# Patient Record
Sex: FEMALE | Race: WHITE | Employment: FULL TIME | URBAN - METROPOLITAN AREA
[De-identification: names, ages, dates, MRNs, and addresses within clinical notes are randomized per-mention and may not be internally consistent; named-entity substitution may affect disease eponyms.]

---

## 2019-05-18 ENCOUNTER — ED HISTORICAL/CONVERTED ENCOUNTER (OUTPATIENT)
Dept: OTHER | Age: 39
End: 2019-05-18

## 2019-06-07 ENCOUNTER — OP HISTORICAL/CONVERTED ENCOUNTER (OUTPATIENT)
Dept: OTHER | Age: 39
End: 2019-06-07

## 2019-06-19 ENCOUNTER — OP HISTORICAL/CONVERTED ENCOUNTER (OUTPATIENT)
Dept: OTHER | Age: 39
End: 2019-06-19

## 2021-12-02 ENCOUNTER — ANESTHESIA EVENT (OUTPATIENT)
Dept: MEDSURG UNIT | Age: 41
End: 2021-12-02
Payer: SELF-PAY

## 2021-12-02 PROBLEM — Z41.1 ENCOUNTER FOR COSMETIC SURGERY: Status: ACTIVE | Noted: 2021-12-02

## 2021-12-02 RX ORDER — ZOLPIDEM TARTRATE 10 MG/1
TABLET ORAL
COMMUNITY

## 2021-12-02 RX ORDER — GABAPENTIN 600 MG/1
900 TABLET ORAL 4 TIMES DAILY
COMMUNITY

## 2021-12-02 NOTE — H&P
Melvi Shine  : 1980  DOS: 2021    Chief Complaint: consultation       Allergies: No Non-Medication Allergies (NNMA) , Reglan       Medications: zolpidem 10 mg oral tablet , gabapentin 600 mg oral tablet       Medical History: Height: 5' 7\", Weight: 134 lbs    Pulmonary System: Negative  Cardiac System: Negative  Blood and Liver Systems: Negative  Neurologic and Endocrine Systems: Thyroid trouble, Anxiety, Depression, ADHD, Arthritis. Psoriasis, Atopic Dermatitis  GI,  and Reproductive Systems: Negative  Cancer: Negative  Other: RLS, Unspecified tissue disorder, Graves Disease, Vitiligo    Surgical History: Splenectomy  ,  Hysterectomy       Family History: Cancer Mother , Heart Disease Maternal Grandmother , Melanoma Mother , Depression Paternal Grandfather , Hypertension Father , Depression Father , High Blood Pressure Father , Heart Disease Father , Hypertension Paternal Grandfather , Depression Sister , High Blood Pressure Paternal Grandfather , Cancer Maternal Grandfather , Heart Disease Paternal Grandfather , Stroke Father , Depression Daughter , Hypertension Maternal Grandmother , Depression Brother , High Blood Pressure Maternal Grandmother       Social History: Pregnancy   3 Children          Ms. 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 She is an otherwise healthy woman who has seen and appreciated loss of upper pole fullness and involutional changes of her breasts following the birth of 3 children that are ages 15, 25, and 24 with breast-feeding. Her breast cancer history is negative, and she has never had a mammogram.  She understands that over time she has seen some skin tone changes to her breasts and was hoping to avoid the need for a mastopexy as well as implant augmentation. She denies previous elective surgery.     Review of systems reveals normal heart and lung sounds. Examination demonstrates otherwise normally developed and equal breast anatomy with loss of upper pole fullness, postpartum involutional changes, and approximately 2 cm of pseudoptosis. Her nipple areolar complexes are minimally enlarged but sit above the inframammary fold level. Her sternal notch to nipple distance is about 23.5 cm bilaterally, and her ideal nipple position is around 19-20 cm. The nipple to fold distance is about 10 cm bilaterally and her base diameter is 14 cm. I had a lengthy discussion with Melvi, regarding breast augmentation mammoplasty. Angelia Mendosa understands this is performed under a general anesthetic on an outpatient basis. I diagrammed on paper and patients right breast where the incisions are made and we discussed the differences between saline and silicone implants, smooth versus textured, round versus anatomic, submuscular versus subglandular, and the various incisions. In most cases an implant is placed in a submuscular plane and the incisions are closed in layers with dissolvable sutures. A compression Ace wrap is used for 2 days then converted to a loose surgical bra or no bra at all for 4 weeks. There is no strenuous exercise for 4 weeks. The risks and complications include but are not limited to infection, pain, bleeding, hematoma's requiring re-operation, skin sensitivity changes, vascular compromise to tissues resulting in partial or complete loss of tissues, resultant open wounds, the need for long term wound care and dressing changes and scarring, irregularities and asymmetries requiring touch-up and revision surgery, an unacceptable cosmetic result, and other things including cardiac and pulmonary risks that include death. Implant specific problems include capsular contracture, scalloping or rippling, implant malposition, implant failure, and implant infection.     In addition to augmentation my concern is that she will require a skin tightening procedure or mastopexy to provide her with the most ideal cosmetic result. We discussed ptosis, pseudoptosis, and mastopexy procedures to include aram areolar, vertical, and full wise pattern. She understands once the implant of her choosing is placed usually in a submuscular plane, additional incisions are made around the nipple areolar complex and the lower pole of the breast to address nipple areolar complex size and position. Our goal is to ensure all tissue sits above the inframammary fold. Any skin that is excised from the lower pole results in an excision wound that is closed in layers with dissolvable sutures. A compression garment is used with associated activity restrictions while she is healing which can extend beyond 6 weeks. There is no exercise for 4 weeks. The risks and complications include but are not limited to infection, pain, bleeding, hematomas requiring re-operation, skin sensitivity changes, vascular compromise to tissues resulting in partial or complete loss of tissues, resultant open wounds, the need for long term wound care and dressing changes and scarring, irregularities and asymmetries requiring touch-up and revision surgery, an unacceptable cosmetic result, and other things including cardiac and pulmonary risks that include death. Additional implant specific risks include capsule contracture, scalloping or rippling, implant malposition, implant failure, and implant infection. We also discussed breast implant illness and JOVON-ALCL. She was sized today with BenchPrep and selected a 400-425 cc smooth round cohesive gel device in a submuscular plane. I believe she may still require at least a aram areolar mastopexy. Her questions were answered, and pictures were taken. They will meet with Ena to discuss the fees. Addendum: Patient re sized at her pre op visit with BenchPrep and decided on a 350cc smooth round gel device.     The patient was counseled about the risks of rut Covid-19 during their perioperative period and any recovery window from their procedure. The patient was made aware that rut Covid-19 may worsen their prognosis for recovering from their procedure and lend to a higher morbidity and/or mortality risk. All material risks, benefits, and reasonable alternatives including postponing the procedure were discussed. The patient DOES wish to proceed with their procedure at this time. Chucho Murillo M.D.  FACS

## 2021-12-03 ENCOUNTER — HOSPITAL ENCOUNTER (OUTPATIENT)
Age: 41
Setting detail: OUTPATIENT SURGERY
Discharge: HOME | End: 2021-12-03
Attending: SURGERY | Admitting: SURGERY
Payer: SELF-PAY

## 2021-12-03 ENCOUNTER — ANESTHESIA (OUTPATIENT)
Dept: MEDSURG UNIT | Age: 41
End: 2021-12-03
Payer: SELF-PAY

## 2021-12-03 VITALS
WEIGHT: 135 LBS | OXYGEN SATURATION: 97 % | RESPIRATION RATE: 13 BRPM | DIASTOLIC BLOOD PRESSURE: 79 MMHG | BODY MASS INDEX: 21.19 KG/M2 | TEMPERATURE: 98.2 F | HEART RATE: 76 BPM | SYSTOLIC BLOOD PRESSURE: 119 MMHG | HEIGHT: 67 IN

## 2021-12-03 PROCEDURE — 77030002986 HC SUT PROL J&J -A: Performed by: SURGERY

## 2021-12-03 PROCEDURE — 77030011265 HC ELECTRD BLD HEX COVD -A: Performed by: SURGERY

## 2021-12-03 PROCEDURE — 77030026227 HC FUNL KELLR 2 PCH ALGN -C: Performed by: SURGERY

## 2021-12-03 PROCEDURE — 74011000250 HC RX REV CODE- 250: Performed by: NURSE ANESTHETIST, CERTIFIED REGISTERED

## 2021-12-03 PROCEDURE — 77030002933 HC SUT MCRYL J&J -A: Performed by: SURGERY

## 2021-12-03 PROCEDURE — 77030019908 HC STETH ESOPH SIMS -A: Performed by: ANESTHESIOLOGY

## 2021-12-03 PROCEDURE — 2709999900 HC NON-CHARGEABLE SUPPLY: Performed by: SURGERY

## 2021-12-03 PROCEDURE — 74011000250 HC RX REV CODE- 250: Performed by: SURGERY

## 2021-12-03 PROCEDURE — 74011250636 HC RX REV CODE- 250/636: Performed by: NURSE ANESTHETIST, CERTIFIED REGISTERED

## 2021-12-03 PROCEDURE — 77030040361 HC SLV COMPR DVT MDII -B: Performed by: SURGERY

## 2021-12-03 PROCEDURE — 77030026438 HC STYL ET INTUB CARD -A: Performed by: ANESTHESIOLOGY

## 2021-12-03 PROCEDURE — 77030008462 HC STPLR SKN PROX J&J -A: Performed by: SURGERY

## 2021-12-03 PROCEDURE — 77030038692 HC WND DEB SYS IRMX -B: Performed by: SURGERY

## 2021-12-03 PROCEDURE — 74011250637 HC RX REV CODE- 250/637: Performed by: ANESTHESIOLOGY

## 2021-12-03 PROCEDURE — 77030031139 HC SUT VCRL2 J&J -A: Performed by: SURGERY

## 2021-12-03 PROCEDURE — 74011250636 HC RX REV CODE- 250/636: Performed by: ANESTHESIOLOGY

## 2021-12-03 PROCEDURE — 74011250636 HC RX REV CODE- 250/636: Performed by: SURGERY

## 2021-12-03 PROCEDURE — 76030000004 HC AMB SURG OR TIME 2 TO 2.5: Performed by: SURGERY

## 2021-12-03 PROCEDURE — 76210000037 HC AMBSU PH I REC 2 TO 2.5 HR: Performed by: SURGERY

## 2021-12-03 PROCEDURE — 77030008684 HC TU ET CUF COVD -B: Performed by: ANESTHESIOLOGY

## 2021-12-03 PROCEDURE — 76060000064 HC AMB SURG ANES 2 TO 2.5 HR: Performed by: SURGERY

## 2021-12-03 PROCEDURE — 77030041680 HC PNCL ELECSURG SMK EVAC CNMD -B: Performed by: SURGERY

## 2021-12-03 DEVICE — SMOOTH ROUND MODERATE PLUS PROFILE GEL-FILLED BREAST IMPLANT, 400CC  SMOOTH ROUND SILICONE
Type: IMPLANTABLE DEVICE | Site: BREAST | Status: FUNCTIONAL
Brand: MENTOR MEMORYGEL BREAST IMPLANT

## 2021-12-03 RX ORDER — PROPOFOL 10 MG/ML
INJECTION, EMULSION INTRAVENOUS AS NEEDED
Status: DISCONTINUED | OUTPATIENT
Start: 2021-12-03 | End: 2021-12-03 | Stop reason: HOSPADM

## 2021-12-03 RX ORDER — NEOSTIGMINE METHYLSULFATE 1 MG/ML
INJECTION, SOLUTION INTRAVENOUS AS NEEDED
Status: DISCONTINUED | OUTPATIENT
Start: 2021-12-03 | End: 2021-12-03 | Stop reason: HOSPADM

## 2021-12-03 RX ORDER — MORPHINE SULFATE 2 MG/ML
2 INJECTION, SOLUTION INTRAMUSCULAR; INTRAVENOUS
Status: DISCONTINUED | OUTPATIENT
Start: 2021-12-03 | End: 2021-12-03 | Stop reason: HOSPADM

## 2021-12-03 RX ORDER — EPHEDRINE SULFATE/0.9% NACL/PF 50 MG/5 ML
SYRINGE (ML) INTRAVENOUS AS NEEDED
Status: DISCONTINUED | OUTPATIENT
Start: 2021-12-03 | End: 2021-12-03 | Stop reason: HOSPADM

## 2021-12-03 RX ORDER — FENTANYL CITRATE 50 UG/ML
25 INJECTION, SOLUTION INTRAMUSCULAR; INTRAVENOUS
Status: COMPLETED | OUTPATIENT
Start: 2021-12-03 | End: 2021-12-03

## 2021-12-03 RX ORDER — SODIUM CHLORIDE 9 MG/ML
25 INJECTION, SOLUTION INTRAVENOUS CONTINUOUS
Status: DISCONTINUED | OUTPATIENT
Start: 2021-12-03 | End: 2021-12-03 | Stop reason: HOSPADM

## 2021-12-03 RX ORDER — MIDAZOLAM HYDROCHLORIDE 1 MG/ML
1 INJECTION, SOLUTION INTRAMUSCULAR; INTRAVENOUS AS NEEDED
Status: DISCONTINUED | OUTPATIENT
Start: 2021-12-03 | End: 2021-12-03 | Stop reason: HOSPADM

## 2021-12-03 RX ORDER — HYDROMORPHONE HYDROCHLORIDE 2 MG/ML
INJECTION, SOLUTION INTRAMUSCULAR; INTRAVENOUS; SUBCUTANEOUS AS NEEDED
Status: DISCONTINUED | OUTPATIENT
Start: 2021-12-03 | End: 2021-12-03 | Stop reason: HOSPADM

## 2021-12-03 RX ORDER — SODIUM CHLORIDE 0.9 % (FLUSH) 0.9 %
5-40 SYRINGE (ML) INJECTION EVERY 8 HOURS
Status: DISCONTINUED | OUTPATIENT
Start: 2021-12-03 | End: 2021-12-03 | Stop reason: HOSPADM

## 2021-12-03 RX ORDER — ROPIVACAINE HYDROCHLORIDE 5 MG/ML
30 INJECTION, SOLUTION EPIDURAL; INFILTRATION; PERINEURAL AS NEEDED
Status: DISCONTINUED | OUTPATIENT
Start: 2021-12-03 | End: 2021-12-03 | Stop reason: HOSPADM

## 2021-12-03 RX ORDER — KETAMINE HYDROCHLORIDE 10 MG/ML
INJECTION, SOLUTION INTRAMUSCULAR; INTRAVENOUS AS NEEDED
Status: DISCONTINUED | OUTPATIENT
Start: 2021-12-03 | End: 2021-12-03 | Stop reason: HOSPADM

## 2021-12-03 RX ORDER — FENTANYL CITRATE 50 UG/ML
INJECTION, SOLUTION INTRAMUSCULAR; INTRAVENOUS AS NEEDED
Status: DISCONTINUED | OUTPATIENT
Start: 2021-12-03 | End: 2021-12-03 | Stop reason: HOSPADM

## 2021-12-03 RX ORDER — GLYCOPYRROLATE 0.2 MG/ML
INJECTION INTRAMUSCULAR; INTRAVENOUS AS NEEDED
Status: DISCONTINUED | OUTPATIENT
Start: 2021-12-03 | End: 2021-12-03 | Stop reason: HOSPADM

## 2021-12-03 RX ORDER — LIDOCAINE HYDROCHLORIDE 10 MG/ML
0.1 INJECTION, SOLUTION EPIDURAL; INFILTRATION; INTRACAUDAL; PERINEURAL AS NEEDED
Status: DISCONTINUED | OUTPATIENT
Start: 2021-12-03 | End: 2021-12-03 | Stop reason: HOSPADM

## 2021-12-03 RX ORDER — LIDOCAINE HYDROCHLORIDE 20 MG/ML
INJECTION, SOLUTION EPIDURAL; INFILTRATION; INTRACAUDAL; PERINEURAL AS NEEDED
Status: DISCONTINUED | OUTPATIENT
Start: 2021-12-03 | End: 2021-12-03 | Stop reason: HOSPADM

## 2021-12-03 RX ORDER — HYDROMORPHONE HYDROCHLORIDE 1 MG/ML
0.5 INJECTION, SOLUTION INTRAMUSCULAR; INTRAVENOUS; SUBCUTANEOUS
Status: DISCONTINUED | OUTPATIENT
Start: 2021-12-03 | End: 2021-12-03 | Stop reason: HOSPADM

## 2021-12-03 RX ORDER — SODIUM CHLORIDE, SODIUM LACTATE, POTASSIUM CHLORIDE, CALCIUM CHLORIDE 600; 310; 30; 20 MG/100ML; MG/100ML; MG/100ML; MG/100ML
100 INJECTION, SOLUTION INTRAVENOUS CONTINUOUS
Status: DISCONTINUED | OUTPATIENT
Start: 2021-12-03 | End: 2021-12-03 | Stop reason: HOSPADM

## 2021-12-03 RX ORDER — MIDAZOLAM HYDROCHLORIDE 1 MG/ML
0.5 INJECTION, SOLUTION INTRAMUSCULAR; INTRAVENOUS
Status: DISCONTINUED | OUTPATIENT
Start: 2021-12-03 | End: 2021-12-03 | Stop reason: HOSPADM

## 2021-12-03 RX ORDER — PHENYLEPHRINE HCL IN 0.9% NACL 0.4MG/10ML
SYRINGE (ML) INTRAVENOUS AS NEEDED
Status: DISCONTINUED | OUTPATIENT
Start: 2021-12-03 | End: 2021-12-03 | Stop reason: HOSPADM

## 2021-12-03 RX ORDER — OXYCODONE HYDROCHLORIDE 5 MG/1
5 TABLET ORAL ONCE
Status: COMPLETED | OUTPATIENT
Start: 2021-12-03 | End: 2021-12-03

## 2021-12-03 RX ORDER — SODIUM CHLORIDE, SODIUM LACTATE, POTASSIUM CHLORIDE, CALCIUM CHLORIDE 600; 310; 30; 20 MG/100ML; MG/100ML; MG/100ML; MG/100ML
INJECTION, SOLUTION INTRAVENOUS
Status: DISCONTINUED | OUTPATIENT
Start: 2021-12-03 | End: 2021-12-03 | Stop reason: HOSPADM

## 2021-12-03 RX ORDER — ACETAMINOPHEN 325 MG/1
650 TABLET ORAL ONCE
Status: DISCONTINUED | OUTPATIENT
Start: 2021-12-03 | End: 2021-12-03 | Stop reason: HOSPADM

## 2021-12-03 RX ORDER — DEXAMETHASONE SODIUM PHOSPHATE 4 MG/ML
INJECTION, SOLUTION INTRA-ARTICULAR; INTRALESIONAL; INTRAMUSCULAR; INTRAVENOUS; SOFT TISSUE AS NEEDED
Status: DISCONTINUED | OUTPATIENT
Start: 2021-12-03 | End: 2021-12-03 | Stop reason: HOSPADM

## 2021-12-03 RX ORDER — SUCCINYLCHOLINE CHLORIDE 20 MG/ML
INJECTION INTRAMUSCULAR; INTRAVENOUS AS NEEDED
Status: DISCONTINUED | OUTPATIENT
Start: 2021-12-03 | End: 2021-12-03 | Stop reason: HOSPADM

## 2021-12-03 RX ORDER — FENTANYL CITRATE 50 UG/ML
50 INJECTION, SOLUTION INTRAMUSCULAR; INTRAVENOUS AS NEEDED
Status: DISCONTINUED | OUTPATIENT
Start: 2021-12-03 | End: 2021-12-03 | Stop reason: HOSPADM

## 2021-12-03 RX ORDER — ONDANSETRON 2 MG/ML
INJECTION INTRAMUSCULAR; INTRAVENOUS AS NEEDED
Status: DISCONTINUED | OUTPATIENT
Start: 2021-12-03 | End: 2021-12-03 | Stop reason: HOSPADM

## 2021-12-03 RX ORDER — ONDANSETRON 2 MG/ML
4 INJECTION INTRAMUSCULAR; INTRAVENOUS AS NEEDED
Status: DISCONTINUED | OUTPATIENT
Start: 2021-12-03 | End: 2021-12-03 | Stop reason: HOSPADM

## 2021-12-03 RX ORDER — DIPHENHYDRAMINE HYDROCHLORIDE 50 MG/ML
12.5 INJECTION, SOLUTION INTRAMUSCULAR; INTRAVENOUS AS NEEDED
Status: DISCONTINUED | OUTPATIENT
Start: 2021-12-03 | End: 2021-12-03 | Stop reason: HOSPADM

## 2021-12-03 RX ORDER — ROCURONIUM BROMIDE 10 MG/ML
INJECTION, SOLUTION INTRAVENOUS AS NEEDED
Status: DISCONTINUED | OUTPATIENT
Start: 2021-12-03 | End: 2021-12-03 | Stop reason: HOSPADM

## 2021-12-03 RX ORDER — SODIUM CHLORIDE 0.9 % (FLUSH) 0.9 %
5-40 SYRINGE (ML) INJECTION AS NEEDED
Status: DISCONTINUED | OUTPATIENT
Start: 2021-12-03 | End: 2021-12-03 | Stop reason: HOSPADM

## 2021-12-03 RX ORDER — MIDAZOLAM HYDROCHLORIDE 1 MG/ML
INJECTION, SOLUTION INTRAMUSCULAR; INTRAVENOUS AS NEEDED
Status: DISCONTINUED | OUTPATIENT
Start: 2021-12-03 | End: 2021-12-03 | Stop reason: HOSPADM

## 2021-12-03 RX ADMIN — MIDAZOLAM 2 MG: 1 INJECTION INTRAMUSCULAR; INTRAVENOUS at 07:26

## 2021-12-03 RX ADMIN — OXYCODONE 5 MG: 5 TABLET ORAL at 11:35

## 2021-12-03 RX ADMIN — NEOSTIGMINE METHYLSULFATE 3 MG: 1 INJECTION, SOLUTION INTRAVENOUS at 09:41

## 2021-12-03 RX ADMIN — WATER 2 G: 1 INJECTION INTRAMUSCULAR; INTRAVENOUS; SUBCUTANEOUS at 07:47

## 2021-12-03 RX ADMIN — SODIUM CHLORIDE, POTASSIUM CHLORIDE, SODIUM LACTATE AND CALCIUM CHLORIDE: 600; 310; 30; 20 INJECTION, SOLUTION INTRAVENOUS at 07:25

## 2021-12-03 RX ADMIN — HYDROMORPHONE HYDROCHLORIDE 0.5 MG: 2 INJECTION, SOLUTION INTRAMUSCULAR; INTRAVENOUS; SUBCUTANEOUS at 10:01

## 2021-12-03 RX ADMIN — HYDROMORPHONE HYDROCHLORIDE 0.25 MG: 2 INJECTION, SOLUTION INTRAMUSCULAR; INTRAVENOUS; SUBCUTANEOUS at 09:39

## 2021-12-03 RX ADMIN — Medication 80 MCG: at 08:20

## 2021-12-03 RX ADMIN — SODIUM CHLORIDE, POTASSIUM CHLORIDE, SODIUM LACTATE AND CALCIUM CHLORIDE 100 ML/HR: 600; 310; 30; 20 INJECTION, SOLUTION INTRAVENOUS at 10:50

## 2021-12-03 RX ADMIN — ROCURONIUM BROMIDE 30 MG: 10 SOLUTION INTRAVENOUS at 07:39

## 2021-12-03 RX ADMIN — GLYCOPYRROLATE 0.4 MG: 0.2 INJECTION, SOLUTION INTRAMUSCULAR; INTRAVENOUS at 09:41

## 2021-12-03 RX ADMIN — DEXAMETHASONE SODIUM PHOSPHATE 8 MG: 4 INJECTION, SOLUTION INTRAMUSCULAR; INTRAVENOUS at 07:47

## 2021-12-03 RX ADMIN — ONDANSETRON HYDROCHLORIDE 4 MG: 2 INJECTION, SOLUTION INTRAMUSCULAR; INTRAVENOUS at 07:47

## 2021-12-03 RX ADMIN — FENTANYL CITRATE 25 MCG: 0.05 INJECTION, SOLUTION INTRAMUSCULAR; INTRAVENOUS at 10:17

## 2021-12-03 RX ADMIN — ROCURONIUM BROMIDE 10 MG: 10 SOLUTION INTRAVENOUS at 07:36

## 2021-12-03 RX ADMIN — HYDROMORPHONE HYDROCHLORIDE 1 MG: 2 INJECTION, SOLUTION INTRAMUSCULAR; INTRAVENOUS; SUBCUTANEOUS at 09:56

## 2021-12-03 RX ADMIN — LIDOCAINE HYDROCHLORIDE 60 MG: 20 INJECTION, SOLUTION EPIDURAL; INFILTRATION; INTRACAUDAL; PERINEURAL at 07:36

## 2021-12-03 RX ADMIN — FENTANYL CITRATE 50 MCG: 50 INJECTION, SOLUTION INTRAMUSCULAR; INTRAVENOUS at 07:36

## 2021-12-03 RX ADMIN — FENTANYL CITRATE 25 MCG: 0.05 INJECTION, SOLUTION INTRAMUSCULAR; INTRAVENOUS at 10:32

## 2021-12-03 RX ADMIN — HYDROMORPHONE HYDROCHLORIDE 0.25 MG: 2 INJECTION, SOLUTION INTRAMUSCULAR; INTRAVENOUS; SUBCUTANEOUS at 09:44

## 2021-12-03 RX ADMIN — FENTANYL CITRATE 25 MCG: 0.05 INJECTION, SOLUTION INTRAMUSCULAR; INTRAVENOUS at 10:22

## 2021-12-03 RX ADMIN — KETAMINE HYDROCHLORIDE 25 MG: 10 INJECTION, SOLUTION INTRAMUSCULAR; INTRAVENOUS at 07:36

## 2021-12-03 RX ADMIN — SUCCINYLCHOLINE CHLORIDE 140 MG: 20 INJECTION, SOLUTION INTRAMUSCULAR; INTRAVENOUS at 07:36

## 2021-12-03 RX ADMIN — Medication 10 MG: at 08:22

## 2021-12-03 RX ADMIN — FENTANYL CITRATE 25 MCG: 0.05 INJECTION, SOLUTION INTRAMUSCULAR; INTRAVENOUS at 10:27

## 2021-12-03 RX ADMIN — FENTANYL CITRATE 50 MCG: 50 INJECTION, SOLUTION INTRAMUSCULAR; INTRAVENOUS at 07:53

## 2021-12-03 RX ADMIN — PROPOFOL 150 MG: 10 INJECTION, EMULSION INTRAVENOUS at 07:36

## 2021-12-03 RX ADMIN — Medication 10 MG: at 08:19

## 2021-12-03 NOTE — BRIEF OP NOTE
Brief Postoperative Note    Patient: Tim Chamberlain  YOB: 1980  MRN: [id number]    Date of Procedure: 12/3/2021     Pre-Op Diagnosis: COSMETIC    Post-Op Diagnosis: Same as preoperative diagnosis. Procedure(s):  BILATERAL BREAST AUGMENTATION WITH MASTOPEXY  . Surgeon(s):  Qian Esposito MD    Surgical Assistant: Surg Asst-1: Morales Agrawal    Anesthesia: General     Estimated Blood Loss (mL): less than 50     Complications: None    Specimens: * No specimens in log *     Implants:   Implant Name Type Inv. Item Serial No.  Lot No. LRB No. Used Action   IMPLANT BRST 400CC DIA31. 1CM P4CM [employer] RND MOD + - [id number]  IMPLANT BRST 400CC DIA31. 1CM P4CM [employer] RND MOD + [id number] [employer] [id number] Right 1 Implanted   IMPLANT BRST 400CC DIA31. 1CM P4CM [employer] RND MOD + - [id number]  IMPLANT BRST 400CC DIA31. 1CM P4CM [employer]OTH RND MOD + [id number] [employer] [id number] Left 1 Implanted       Drains: * No LDAs found *    Findings: normal for age and history    Electronically Signed by Sonny Monzon MD on 12/3/2021 at 9:48 AM

## 2021-12-03 NOTE — ANESTHESIA PREPROCEDURE EVALUATION
Relevant Problems   No relevant active problems       Anesthetic History   No history of anesthetic complications            Review of Systems / Medical History  Patient summary reviewed, nursing notes reviewed and pertinent labs reviewed    Pulmonary  Within defined limits                 Neuro/Psych   Within defined limits           Cardiovascular  Within defined limits                Exercise tolerance: >4 METS     GI/Hepatic/Renal  Within defined limits              Endo/Other  Within defined limits    Hypothyroidism       Other Findings   Comments: Connective tissue disorder            Physical Exam    Airway  Mallampati: II  TM Distance: > 6 cm  Neck ROM: normal range of motion   Mouth opening: Normal     Cardiovascular  Regular rate and rhythm,  S1 and S2 normal,  no murmur, click, rub, or gallop             Dental  No notable dental hx       Pulmonary  Breath sounds clear to auscultation               Abdominal  GI exam deferred       Other Findings            Anesthetic Plan    ASA: 2  Anesthesia type: general          Induction: Intravenous  Anesthetic plan and risks discussed with: Patient

## 2021-12-03 NOTE — DISCHARGE INSTRUCTIONS
Leave the surgical garment ON and DRY for 48 hours then may remove for shower. Resume any important pre op medications and diet but use sport drinks like Gatorade or Power aid instead of water for 2-3 days and extra protein in diet helps wounds heal.  Keep head elevated at night when sleeping about 30 degrees, do not lay flat for about 2 weeks  NO strenuous activity or exercise for 4 weeks  Walk every 1-2 hours during the day in house while awake for 5-10 minutes during the first 2 weeks  Use stool softeners to prevent constipation  Do not take pain medications on an empty stomach  It is NOT uncommon that the bra and the white implant strap causes some swelling of your abdomen for a few days or weeks, do not get alarmed as this will quickly resolve. When you shower in 48 hours, remove the surgical garment/bra, remove and save the white implant strap with the safety pin from the back/bottom of the bra, discard any lose gauze, shower like you normally would, place antibiotic ointment of choice over all incisions around the nipple with clean gauze, and place the second garment on as you found the first one. Be sure to safety pin the white elastic implant strap to the back/bottom of the garment like you found it, and use above the implants with the garment at all times until your first follow up appointment. Be sure to bring it to the purple line drawn on the strap for the correct tension. The purpose of the strap is to place gentle downward pressure on the implants. Call DR. Siena Wood at [phone] (cell) for questions          DISCHARGE SUMMARY from Nurse   POST ANESTHESIA INSTRUCTIONS    PATIENT INSTRUCTIONS:    After general anesthesia or intravenous sedation, for 24 hours or while taking prescription Narcotics:  · Limit your activities  · Do not drive and operate hazardous machinery  · Do not make important personal or business decisions  · Do  not drink alcoholic beverages  · If you have not urinated within 8 hours after discharge, please contact your surgeon on call. Report the following to your surgeon:  · Excessive pain, swelling, redness or odor of or around the surgical area  · Temperature over 100.5  · Nausea and vomiting lasting longer than 4 hours or if unable to take medications  · Any signs of decreased circulation or nerve impairment to extremity: change in color, persistent  numbness, tingling, coldness or increase pain  · Any questions    What to do at Home:      If you have any concerns, please call Dr Lenka Rocha    *  Please give a list of your current medications to your Primary Care Provider. *  Please update this list whenever your medications are discontinued, doses are      changed, or new medications (including over-the-counter products) are added. *  Please carry medication information at all times in case of emergency situations. These are general instructions for a healthy lifestyle:    No smoking/ No tobacco products/ Avoid exposure to second hand smoke  Surgeon General's Warning:  Quitting smoking now greatly reduces serious risk to your health. Obesity, smoking, and sedentary lifestyle greatly increases your risk for illness    A healthy diet, regular physical exercise & weight monitoring are important for maintaining a healthy lifestyle    You may be retaining fluid if you have a history of heart failure or if you experience any of the following symptoms:  Weight gain of 3 pounds or more overnight or 5 pounds in a week, increased swelling in our hands or feet or shortness of breath while lying flat in bed. Please call your doctor as soon as you notice any of these symptoms; do not wait until your next office visit. The discharge information has been reviewed with the patient and caregiver. The patient and caregiver verbalized understanding.   Discharge medications reviewed with the patient and caregiver and appropriate educational materials and side effects teaching were provided.   ___________________________________________________________________________________________________________________________________  Anticipate a phone call from [facility]

## 2021-12-03 NOTE — INTERVAL H&P NOTE
Update History & Physical    The Patient's History and Physical of November 27, 2021 was reviewed with the patient and I examined the patient. There was no change. The surgical site was confirmed by the patient and me. Plan:  The risk, benefits, expected outcome, and alternative to the recommended procedure have been discussed with the patient. Patient understands and wants to proceed with the procedure.     Electronically signed by Karine Hu MD on 12/3/2021 at 7:15 AM

## 2021-12-03 NOTE — OP NOTES
[address]   OPERATIVE REPORT    Name:  Rufino Lea  MR#:  [id number]  :  1980  ACCOUNT #:  [de-identified]  DATE OF SERVICE:  2021      PREOPERATIVE DIAGNOSIS:  Mammary hypoplasia, grade I ptosis, desires breast augmentation mammoplasty. POSTOPERATIVE DIAGNOSIS:  Mammary hypoplasia, grade I ptosis, desires breast augmentation mammoplasty. PROCEDURE PERFORMED:  Bilateral submuscular cohesive silicone gel breast augmentation mammoplasty and bilateral periareolar mastopexy. SURGEON:  Rehana Middleton MD    ASSISTANT:  Aicha Cruz. ANESTHESIA:  General.    COMPLICATIONS:  None. SPECIMENS REMOVED:  None. IMPLANTS:  400 mL smooth round cohesive silicone gel breast implant bilaterally previously described. ESTIMATED BLOOD LOSS:  Approximately 30 mL. IV FLUIDS:  Less than or equal to 1000 mL. DRAINS:  None. STAFF:  OR Staff, Room #4, [address], [address]. FINDINGS:  Normal for age and history. INDICATION FOR PROCEDURE:  The patient is a pleasant 78-year-old female who was seen in the plastic surgery clinic with a desire to undergo breast augmentation mammoplasty. 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  She has three children and has also  her children and has seen and appreciated involutional changes and loss of upper pole fullness. She was interested in breast augmentation and preoperatively selected a 400 mL smooth round moderate plus profile cohesive silicone gel breast implant in the submuscular plane. Because of her degree of soft tissue laxity, it is felt that she will require a skin tightening procedure or mastopexy at the same time and she comes in today for these combined procedures. PROCEDURE:  After appropriate consent was obtained, preoperative markings were placed.   She was taken to the operating room and placed on operating room table in supine position. Satisfactory general endotracheal anesthesia was obtained. SCD devices were placed per routine. Our local anesthesia solution was injected around each breast for postoperative pain management and her chest was sterilely prepped and draped. We performed identical procedure bilaterally beginning on the patient's right side reducing the nipple-areolar complex to 42 mm with a cookie cutter. A #10 scalpel blade was used to make an incision in the periareolar location and an electrocautery was used to dissect through the bottom and lower half of the periareolar incision into the breast tissues of the pectoralis major muscle. A submuscular or subpectoral pocket was then dissected in a standard fashion releasing the inferomedial fibers of the pectoralis major muscle with electrocautery. The lateral soft tissues were also released off the chest wall based on our preoperative markings. Once each pocket was dissected, they were inspected for hemostasis and symmetry. Both pockets were then irrigated with Irrisept irrigation solution and sterile saline. At this point, a Nursery reference number [id number] smooth round moderate plus profile 319 mL cohesive silicone gel breast implant was placed in each pocket. On the right side, it was lot number [id number]. On the left side, it was lot number [id number]. She was sat upright to inspect shape, size, and symmetry. It was at this point it felt that she would require only a periareolar Benelli donut mastopexy. Additional markings were made to correspond to the 12 o'clock position. She was then placed back in the supine position. The breast tissues were all reapproximated in multiple layers with 3-0 Vicryl suture in interrupted simple fashion. The circumferential skin ring was then excised around the nipple-areolar complex with a #10 scalpel blade and electrocautery was used to free up the dermis from the surrounding skin.   A 3-0 Prolene suture on an SH needle was used to place the pinwheel pursestring suture to cinch the surrounding skin down to the nipple-areolar complex, and the superficial skin was reapproximated with 4-0 Monocryl in running subcuticular fashion. Xeroform gauze, 4x4 gauze, with a surgical bra and a wide implant strap were placed. At the end of the bilateral procedures, sponge and needle counts were reported as correct. She was awakened, extubated, and transferred to the recovery room in satisfactory and stable condition. She will be discharged home today in care of her family with prescriptions and instructions and will follow up with me within 2 weeks.         Roxana Rutherford MD      [id number]  D:  12/03/2021 10:01  T:  12/03/2021 12:20  JOB #:  [id number]  CC:  Jennifer Holm MD

## 2021-12-03 NOTE — ANESTHESIA POSTPROCEDURE EVALUATION
Procedure(s):  BILATERAL BREAST AUGMENTATION WITH MASTOPEXY  . .    general    Anesthesia Post Evaluation        Patient location during evaluation: PACU  Note status: Adequate. Level of consciousness: responsive to verbal stimuli and sleepy but conscious  Pain management: satisfactory to patient  Airway patency: patent  Anesthetic complications: no  Cardiovascular status: acceptable  Respiratory status: acceptable  Hydration status: acceptable  Comments: +Post-Anesthesia Evaluation and Assessment    Patient: Carlton Ibarra MRN: [id number]  SSN: [id number]   YOB: 1980  Age: 39 y.o. Sex: female          Cardiovascular Function/Vital Signs    BP (!) 136/90   Pulse 97   Temp 36.8 °C (98.2 °F)   Resp 16   Ht 5' 7\" (1.702 m)   Wt 61.2 kg (135 lb)   SpO2 100%   BMI 21.14 kg/m²     Patient is status post Procedure(s):  BILATERAL BREAST AUGMENTATION WITH MASTOPEXY  . .    Nausea/Vomiting: Controlled. Postoperative hydration reviewed and adequate. Pain:  Pain Scale 1: (P) Numeric (0 - 10) (12/03/21 0955)  Pain Intensity 1: (P) 7 (12/03/21 0955)   Managed. Neurological Status:   Neuro (WDL): Within Defined Limits (12/03/21 0701)   At baseline. Mental Status and Level of Consciousness: Arousable. Pulmonary Status:   O2 Device: Nasal cannula (12/03/21 0955)   Adequate oxygenation and airway patent. Complications related to anesthesia: None    Post-anesthesia assessment completed. No concerns. I have evaluated the patient and the patient is stable and ready to be discharged from PACU . Signed By: Yissel Pena MD    12/3/2021        INITIAL Post-op Vital signs:   Vitals Value Taken Time   /86 12/03/21 1100   Temp 36.8 °C (98.2 °F) 12/03/21 0955   Pulse 72 12/03/21 1115   Resp 14 12/03/21 1115   SpO2 100 % 12/03/21 1115   Vitals shown include unvalidated device data.

## 2022-02-22 ENCOUNTER — APPOINTMENT (OUTPATIENT)
Dept: CT IMAGING | Age: 42
End: 2022-02-22
Attending: EMERGENCY MEDICINE
Payer: COMMERCIAL

## 2022-02-22 ENCOUNTER — HOSPITAL ENCOUNTER (EMERGENCY)
Age: 42
Discharge: HOME | End: 2022-02-22
Attending: EMERGENCY MEDICINE
Payer: COMMERCIAL

## 2022-02-22 VITALS
OXYGEN SATURATION: 100 % | RESPIRATION RATE: 18 BRPM | SYSTOLIC BLOOD PRESSURE: 142 MMHG | HEART RATE: 75 BPM | HEIGHT: 67 IN | TEMPERATURE: 98.6 F | DIASTOLIC BLOOD PRESSURE: 99 MMHG | BODY MASS INDEX: 22.76 KG/M2 | WEIGHT: 145 LBS

## 2022-02-22 DIAGNOSIS — S00.03XA HEMATOMA OF SCALP, INITIAL ENCOUNTER: ICD-10-CM

## 2022-02-22 DIAGNOSIS — S06.0X9A CONCUSSION WITH LOSS OF CONSCIOUSNESS, INITIAL ENCOUNTER: Primary | ICD-10-CM

## 2022-02-22 PROCEDURE — 70450 CT HEAD/BRAIN W/O DYE: CPT

## 2022-02-22 PROCEDURE — 99283 EMERGENCY DEPT VISIT LOW MDM: CPT

## 2022-02-22 PROCEDURE — 74011250637 HC RX REV CODE- 250/637: Performed by: EMERGENCY MEDICINE

## 2022-02-22 RX ORDER — OXYCODONE AND ACETAMINOPHEN 5; 325 MG/1; MG/1
1 TABLET ORAL
Status: COMPLETED | OUTPATIENT
Start: 2022-02-22 | End: 2022-02-22

## 2022-02-22 RX ORDER — OXYCODONE AND ACETAMINOPHEN 5; 325 MG/1; MG/1
1 TABLET ORAL
Qty: 8 TABLET | Refills: 0 | Status: SHIPPED | OUTPATIENT
Start: 2022-02-22 | End: 2022-02-25

## 2022-02-22 RX ADMIN — OXYCODONE HYDROCHLORIDE AND ACETAMINOPHEN 1 TABLET: 5; 325 TABLET ORAL at 17:10

## 2022-02-22 NOTE — DISCHARGE INSTRUCTIONS
Thank you! Thank you for allowing me to care for you in the emergency department. I sincerely hope that you are satisfied with your visit today. It is my goal to provide you with excellent care. Below you will find a list of your labs and imaging from your visit today. Should you have any questions regarding these results please do not hesitate to call the emergency department. Labs -   No results found for this or any previous visit (from the past 12 hour(s)). Radiologic Studies -   CT HEAD WO CONT   Final Result   1. No acute intracranial findings. 2. Left scalp hematoma. New radiopaque foreign body. CT Results  (Last 48 hours)                 02/22/22 1730  CT HEAD WO CONT Final result    Impression:  1. No acute intracranial findings. 2. Left scalp hematoma. New radiopaque foreign body. Narrative: Worsening Headache, hematoma left temporal region. Fall 5 days ago. No comparisons are available from the PACS archive at this time. Technique: Axial images  head without IV contrast. Multiplanar reformatting   performed. Dose reduction: All CT scans at this facility are performed using dose reduction   optimization techniques as appropriate to a performed exam including the   following: Automated exposure control, adjustments of the mA and/or kV according   to patient's size, or use of iterative reconstruction technique. Findings: No abnormal brain density. No mass effect, extra-axial fluid   collection, hemorrhage. CSF-containing structures normal size, shape, position. No calvarial fractures. Left lateral scalp soft tissue hematoma. Included facial   sinuses and  mastoid air cells are unopacified. CXR Results  (Last 48 hours)      None               If you feel that you have not received excellent quality care or timely care, please ask to speak to the nurse manager. Please choose us in the future for your continued health care needs. ------------------------------------------------------------------------------------------------------------  The exam and treatment you received in the Emergency Department were for an urgent problem and are not intended as complete care. It is important that you follow-up with a doctor, nurse practitioner, or physician assistant to:  (1) confirm your diagnosis,  (2) re-evaluation of changes in your illness and treatment, and  (3) for ongoing care. If your symptoms become worse or you do not improve as expected and you are unable to reach your usual health care provider, you should return to the Emergency Department. We are available 24 hours a day. Please take your discharge instructions with you when you go to your follow-up appointment. If you have any problem arranging a follow-up appointment, contact the Emergency Department immediately. If a prescription has been provided, please have it filled as soon as possible to prevent a delay in treatment. Read the entire medication instruction sheet provided to you by the pharmacy. If you have any questions or reservations about taking the medication due to side effects or interactions with other medications, please call your primary care physician or contact the ER to speak with the charge nurse. Make an appointment with your family doctor or the physician you were referred to for follow-up of this visit as instructed on your discharge paperwork, as this is a mandatory follow-up. Return to the ER if you are unable to be seen or if you are unable to be seen in a timely manner. If you have any problem arranging the follow-up visit, contact the Emergency Department immediately.

## 2022-02-22 NOTE — ED PROVIDER NOTES
EMERGENCY DEPARTMENT HISTORY AND PHYSICAL EXAM        Date: 2/22/2022  Patient Name: Isidra Gonzalez    History of Presenting Illness     No chief complaint on file. History Provided By: Patient    HPI: Isidra Gonzalez, 39 y.o. female with history of Graves' disease and psoriasis who presents with headache. States that she had a fall about 5 days ago. It was a ground-level fall. She hit her head and had loss of consciousness. She was seen at the emergency department there and diagnosed with concussion. CT scan showed no abnormalities. She had a CT scan of her neck as well. States that the headache is worsening today. Pain is in the left side of her head, constant, 8/10, and nonradiating. No vision changes, weakness, vomiting or other symptoms. She is been taking Tylenol for pain control. PCP: Zhane Alanis MD    Current Outpatient Medications   Medication Sig Dispense Refill    oxyCODONE-acetaminophen (Percocet) 5-325 mg per tablet Take 1 Tablet by mouth every six (6) hours as needed for Pain for up to 3 days. Max Daily Amount: 4 Tablets. 8 Tablet 0    gabapentin (NEURONTIN) 600 mg tablet Take 900 mg by mouth four (4) times daily.  zolpidem (Ambien) 10 mg tablet Take  by mouth nightly as needed for Sleep. Past History     Past Medical History:  Past Medical History:   Diagnosis Date    Connective tissue disorder ([facility] [address]     Graves disease     Psoriasis     Vitiligo        Past Surgical History:  Past Surgical History:   Procedure Laterality Date    HX BREAST AUGMENTATION Bilateral 12/3/2021    BILATERAL BREAST AUGMENTATION WITH MASTOPEXY performed by Vandana Beltran MD at [address] HX BREAST REDUCTION Bilateral 12/3/2021    .  performed by Vandana Beltran MD at [address] HX HYSTERECTOMY  2009    HX ORTHOPAEDIC      right knee meniscus repair    HX SPLENECTOMY      HX TONSILLECTOMY      HX UROLOGICAL      [facility]       Family History:  History reviewed. No pertinent family history. Social History:  Social History     Tobacco Use    Smoking status: Never Smoker    Smokeless tobacco: Current User   Substance Use Topics    Alcohol use: Yes     Comment: occ    Drug use: Never       Allergies: Allergies   Allergen Reactions    Reglan [Metoclopramide] Other (comments)     Muscle spasms and twiching         Review of Systems   Review of Systems   Constitutional: Negative for fever. HENT: Negative for congestion. Eyes: Negative for visual disturbance. Respiratory: Negative for shortness of breath. Cardiovascular: Negative for chest pain. Gastrointestinal: Negative for abdominal pain. Genitourinary: Negative for dysuria. Musculoskeletal: Negative for arthralgias. Skin: Negative for rash. Neurological: Positive for headaches. Physical Exam   Constitutional: No acute distress. Well-nourished. Skin: No rash. ENT: No rhinorrhea. No cough. Healing and resolving ecchymosis on the left temporal region of the face and around the left eye. Normal extraocular eye movements. Eye: No proptosis or conjunctival injections. Respiratory: No apparent respiratory distress. Gastrointestinal: Nondistended. Musculoskeletal: No obvious bony deformities. Mild midline spinal tenderness in the cervical region. Neuro: Cranial nerves II through XII grossly intact. No dysmetria with finger-nose testing. 5/5 strength in bilateral upper and lower extremities with intact sensation to light touch in bilateral upper and lower extremities. Diagnostic Study Results     Labs -   No results found for this or any previous visit (from the past 24 hour(s)). Radiologic Studies -   CT HEAD WO CONT   Final Result   1. No acute intracranial findings. 2. Left scalp hematoma. New radiopaque foreign body. CT Results  (Last 48 hours)               02/22/22 1730  CT HEAD WO CONT Final result    Impression:  1.  No acute intracranial findings. 2. Left scalp hematoma. New radiopaque foreign body. Narrative: Worsening Headache, hematoma left temporal region. Fall 5 days ago. No comparisons are available from the PACS archive at this time. Technique: Axial images  head without IV contrast. Multiplanar reformatting   performed. Dose reduction: All CT scans at this facility are performed using dose reduction   optimization techniques as appropriate to a performed exam including the   following: Automated exposure control, adjustments of the mA and/or kV according   to patient's size, or use of iterative reconstruction technique. Findings: No abnormal brain density. No mass effect, extra-axial fluid   collection, hemorrhage. CSF-containing structures normal size, shape, position. No calvarial fractures. Left lateral scalp soft tissue hematoma. Included facial   sinuses and  mastoid air cells are unopacified. CXR Results  (Last 48 hours)    None          Medical Decision Making and ED Course     I reviewed the available vital signs, nursing notes, past medical history, past surgical history, family history, and social history. Vital Signs - Reviewed the patient's vital signs. Patient Vitals for the past 12 hrs:   Temp Pulse Resp BP SpO2   02/22/22 1556 98.6 °F (37 °C)       02/22/22 1555  75 18 (!) 142/99 100 %     Medical Decision Making:   Presented with headache after fall 4 days ago. The differential diagnosis is scalp contusion, hematoma, concussion, intracranial hemorrhage, skull fracture. CT head shows no intracranial hemorrhage. Patient likely having headache related to recent concussion. For no need for further testing at this time. Will prescribe Percocet for few days of pain control. Recommended rest.  Discharged home. Disposition     Discharged home    DISCHARGE PLAN:  1.    Current Discharge Medication List      CONTINUE these medications which have NOT CHANGED    Details gabapentin (NEURONTIN) 600 mg tablet Take 900 mg by mouth four (4) times daily. zolpidem (Ambien) 10 mg tablet Take  by mouth nightly as needed for Sleep. 2.   Follow-up Information     Follow up With Specialties Details Why Contact Info    [address] [facility] Emergency Medicine Go today As soon as possible if symptoms worsen [address] [employer]  [phone]    Primary care doctor  Schedule an appointment as soon as possible for a visit in 3 days As needed         3. Return to ED if worse     Diagnosis     Clinical impression:   1. Concussion with loss of consciousness, initial encounter    2. Hematoma of scalp, initial encounter           Attestation:  Please note that this dictation was completed with [employer], the computer voice recognition software. Quite often unanticipated grammatical, syntax, homophones, and other interpretive errors are inadvertently transcribed by the computer software. Please disregard these errors. Please excuse any errors that have escaped final proofreading. Thank you.   Chad Jefferson, DO

## 2022-02-22 NOTE — ED TRIAGE NOTES
Last Thursday, fell with hematoma to left, diagnosed with concussion, has continued h/a, ''tailbone'' pain, has Zofran and Meclizine

## (undated) DEVICE — STRIP,CLOSURE,WOUND,MEDI-STRIP,1/2X4: Brand: MEDLINE

## (undated) DEVICE — DRESSING,GAUZE,XEROFORM,CURAD,5"X9",ST: Brand: CURAD

## (undated) DEVICE — SPONGE LAP 18X18IN STRL -- 5/PK

## (undated) DEVICE — GARMENT,MEDLINE,DVT,INT,CALF,MED, GEN2: Brand: MEDLINE

## (undated) DEVICE — NEEDLE HYPO 22GA L1.5IN BLK POLYPR HUB S STL REG BVL STR

## (undated) DEVICE — TRAY PREP DRY W/ PREM GLV 2 APPL 6 SPNG 2 UNDPD 1 OVERWRAP

## (undated) DEVICE — NEEDLE HYPO 21GA L1.5IN INTRAMUSCULAR S STL LATCH BVL UP

## (undated) DEVICE — MASTISOL ADHESIVE LIQ 2/3ML

## (undated) DEVICE — HEX-LOCKING BLADE ELECTRODE: Brand: EDGE

## (undated) DEVICE — INTENDED USE FOR SURGICAL MARKING ON INTACT SKIN, ALSO PROVIDES A PERMANENT METHOD OF IDENTIFYING OBJECTS IN THE OPERATING ROOM: Brand: WRITESITE® REGULAR TIP SKIN MARKER

## (undated) DEVICE — DRESSING,GAUZE,XEROFORM,CURAD,1"X8",ST: Brand: CURAD

## (undated) DEVICE — SUTURE VCRL SZ 3-0 L27IN ABSRB UD L24MM FS-1 3/8 CIR REV J442H

## (undated) DEVICE — 450 ML BOTTLE OF 0.05% CHLORHEXIDINE GLUCONATE IN 99.95% STERILE WATER FOR IRRIGATION, USP AND APPLICATOR.: Brand: IRRISEPT ANTIMICROBIAL WOUND LAVAGE

## (undated) DEVICE — SOLUTION IRRIG 1000ML 0.9% SOD CHL USP POUR PLAS BTL

## (undated) DEVICE — Device

## (undated) DEVICE — DECANTER BAG 9": Brand: MEDLINE INDUSTRIES, INC.

## (undated) DEVICE — SYR 10ML CTRL LR LCK NSAF LF --

## (undated) DEVICE — SYSTEM IMPL DEL FOR BRST IMPL FUN (SEE COMMENT)

## (undated) DEVICE — PENCIL SMK EVAC L10FT DIA95MM TBNG NONSTICK W ADPT TO 22MM

## (undated) DEVICE — SUT PROL 3-0 36IN SH DA BLU --

## (undated) DEVICE — GLOVE ORANGE PI 7 1/2   MSG9075

## (undated) DEVICE — SYR 10ML LUER LOK 1/5ML GRAD --

## (undated) DEVICE — SUTURE MCRYL SZ 4-0 L27IN ABSRB UD L19MM PS-2 1/2 CIR PRIM Y426H

## (undated) DEVICE — SPONGE GZ W4XL4IN COT 12 PLY TYP VII WVN C FLD DSGN

## (undated) DEVICE — PLASTICS CHEST BREAST ASU: Brand: MEDLINE INDUSTRIES, INC.